# Patient Record
Sex: FEMALE | Race: WHITE | NOT HISPANIC OR LATINO | Employment: OTHER | URBAN - METROPOLITAN AREA
[De-identification: names, ages, dates, MRNs, and addresses within clinical notes are randomized per-mention and may not be internally consistent; named-entity substitution may affect disease eponyms.]

---

## 2017-01-27 ENCOUNTER — OFFICE VISIT (OUTPATIENT)
Dept: MEDICAL GROUP | Facility: PHYSICIAN GROUP | Age: 62
End: 2017-01-27
Payer: COMMERCIAL

## 2017-01-27 VITALS
SYSTOLIC BLOOD PRESSURE: 110 MMHG | WEIGHT: 105.3 LBS | DIASTOLIC BLOOD PRESSURE: 60 MMHG | TEMPERATURE: 98 F | OXYGEN SATURATION: 96 % | HEART RATE: 95 BPM | HEIGHT: 64 IN | BODY MASS INDEX: 17.98 KG/M2

## 2017-01-27 DIAGNOSIS — Z12.31 SCREENING MAMMOGRAM, ENCOUNTER FOR: ICD-10-CM

## 2017-01-27 DIAGNOSIS — E78.00 PURE HYPERCHOLESTEROLEMIA: ICD-10-CM

## 2017-01-27 DIAGNOSIS — Z00.00 ROUTINE HEALTH MAINTENANCE: Primary | ICD-10-CM

## 2017-01-27 DIAGNOSIS — M85.80 OSTEOPENIA: ICD-10-CM

## 2017-01-27 DIAGNOSIS — F51.01 PRIMARY INSOMNIA: ICD-10-CM

## 2017-01-27 DIAGNOSIS — Z78.0 MENOPAUSE: ICD-10-CM

## 2017-01-27 PROCEDURE — 99396 PREV VISIT EST AGE 40-64: CPT | Performed by: INTERNAL MEDICINE

## 2017-01-27 RX ORDER — SUMATRIPTAN 100 MG/1
TABLET, FILM COATED ORAL
Qty: 10 TAB | Refills: 3 | Status: SHIPPED | OUTPATIENT
Start: 2017-01-27 | End: 2018-06-07 | Stop reason: SDUPTHER

## 2017-01-27 RX ORDER — ZOLPIDEM TARTRATE 10 MG/1
10 TABLET ORAL NIGHTLY PRN
Qty: 30 TAB | Refills: 3 | Status: SHIPPED | OUTPATIENT
Start: 2017-01-27 | End: 2018-06-07 | Stop reason: SDUPTHER

## 2017-01-27 RX ORDER — ROSUVASTATIN CALCIUM 5 MG/1
TABLET, COATED ORAL
Qty: 45 TAB | Refills: 3 | Status: SHIPPED | OUTPATIENT
Start: 2017-01-27 | End: 2018-06-07 | Stop reason: SDUPTHER

## 2017-01-27 ASSESSMENT — PATIENT HEALTH QUESTIONNAIRE - PHQ9: CLINICAL INTERPRETATION OF PHQ2 SCORE: 0

## 2017-01-28 NOTE — PROGRESS NOTES
EXAM note              CHIEF COMPLAINT  Establish Care      History    Mariama Portillo IS A 61 y.o. female here for annual follow-up. Stopped Lipitor secondary to myalgias. Myalgias resolved and she restarted the medication and had myalgias recur. States had lipids recently we do not have those results but states cholesterol once again was over 200. Willing to start any medication. Needs refills for migraine medication. Getting migraines infrequently. Premarin cream for vaginal dryness was too expensive. Like to try to product. Wants refill of Ambien which she takes infrequently. She also takes only half a tablet.    medical history     has a past medical history of Hyperlipidemia; Migraine; and Osteopenia.    SURGICAL history     has past surgical history that includes primary c section.    social history     reports that she has never smoked. She has never used smokeless tobacco. She reports that she drinks alcohol. She reports that she does not use illicit drugs.  Social History     Social History Narrative    patient is a dermatologist.  with 2 children  family history    family history includes Heart Disease in her father; Stroke in her mother.    mEDICATIONS    Current Outpatient Rx   Name  Route  Sig  Dispense  Refill   • rosuvastatin (CRESTOR) 5 MG Tab        1/2 po qd    45 Tab    3     • sumatriptan (IMITREX) 100 MG tablet        TAKE 1 TABLET BY MOUTH ATONSET OF HEADACHE. MAY   REPEAT IN 2 HOURS.    10 Tab    3     • estradiol (ESTRING) 2 MG vaginal ring        Change every 3 months    1 Each    3     • zolpidem (AMBIEN) 10 MG Tab    Oral    Take 1 Tab by mouth at bedtime as needed for Sleep.    30 Tab    3            aLLERGIES    No Known Allergies    Review of systems      Constitutional:  Patient denies fever, chills, tiredness, malaise.   Eyes:  Denies change in visual acuity.   Immunologic:  Denies hives.   HENT:  Denies sinus problems.   Respiratory:  Denies cough, shortness of breath.  "  Cardiovascular:  Denies chest pain, edema.   GI:  Denies abdominal pain.   :  Denies painful urination, urinary frequency, blood in urine, nocturia.   Musculoskeletal:  Denies back pain, neck pain, joint pain.   Integument:  Denies rash, itching.   Neurologic:  Denies headache, sensory changes.   Endocrine:  Denies polyuria, polydipsia, temperature intolerance.   Lymphatic:  Denies swollen glands, weight loss.   Psychiatric:  Denies depression, anxiety or insomnia  All other systems reviewed and negative     Physical Exam    Vital Signs:  Blood pressure 110/60, pulse 95, temperature 36.7 °C (98 °F), height 1.626 m (5' 4\"), weight 47.764 kg (105 lb 4.8 oz), SpO2 96 %.  Constitutional: Well nourished, well developed, no acute distress.  Integument:  Warm, Dry, No erythema, No rash.   HENT:  Normocephalic, Atraumatic, Bilateral external ears normal, tympanic membranes clear, Oropharynx moist, No oral exudates, Nose normal. Neck- Normal range of motion, No tenderness, Supple, No stridor. No thyromegaly.  Eyes:  Vision grossly intact, Conjunctiva normal, No discharge.   Cardiovascular:  Regular rate and rhythm. No M/G/R. no clubbing, cyanosis, or edema.   Respiratory:  Normal breath sounds.   GI:  Bowel sounds normal, Soft, No tenderness, No masses, No pulsatile masses.   Neurologic:  Alert & oriented x 3, Normal motor function, No focal deficits noted.   Musculoskeletal:  Gait is normal. Joints non-tender.  Lymph: No adenopathy.  Psychiatric:  No evidence of agitation or depression.  Assessment & Plan    Diagnoses of Pure hypercholesterolemia, Screening mammogram, encounter for, Flu vaccine need, and Menopause were pertinent to this visit.  Hyperlipidemia: Start on Crestor 2.5 mg daily. Recheck lipids in one year. Already following excellent diet and exercise as  Migraines: Imitrex refilled.  Insomnia: Ambien refill. Use sparingly and only one half tab  Vaginal dryness secondary to menopause: Estring " recommended.  Routine health maintenance: Routine labs are up-to-date. Mammogram ordered. Bone density is up-to-date. Colonoscopy is up-to-date. Diet and exercise are excellent. Follow-up one year.